# Patient Record
(demographics unavailable — no encounter records)

---

## 2025-01-08 NOTE — PHYSICAL EXAM
[Respiratory Effort] : normal respiratory effort [Normal Heart Sounds] : normal heart sounds [2+] : left 2+ [Alert] : alert [Calm] : calm [Abdomen Tenderness] : ~T ~M No abdominal tenderness [de-identified] : WN/WD, NAD [de-identified] : NC/AT [de-identified] : supple [de-identified] : BS+, soft [de-identified] : FROM [de-identified] : grossly intact

## 2025-01-08 NOTE — HISTORY OF PRESENT ILLNESS
[FreeTextEntry1] : 77M PMHx, CAD s/p CABG, pAF/AT on Xarelto, s/p PPM (2015), HLD, renal stones s/p lithotripsy x1 who was admitted last month with c/o nausea, abdominal distention, and worsening generalized abdominal pain. Vascular surgery was consulted for suspected SMA bleed seen on CTA. During hospital stay patient stabilized, vitals WNL, Hgb stable at 8.8, was asymptomatic and no vascular intervention was indicated. He returns today for reevaluation. Patient states that he has been doing well since he left the hospital, his Hgb has been stable, denies abdominal or back pain, and he is back on Xarelto.

## 2025-01-08 NOTE — ASSESSMENT
[FreeTextEntry1] : 77M PMHx, CAD s/p CABG, pAF/AT on Xarelto, s/p PPM (2015), HLD, who was admitted last month with c/o nausea, abdominal distention, and worsening generalized abdominal pain. Vascular surgery was consulted for suspected SMA bleed seen on CTA. He returns today for reevaluation. Patient feels well, tolerating Xarelto and states that his H/H is stable. We discussed the findings of CTA of A/P from his hospitalization and explained that it is less likely that his bleeding was from mesenteric arteries, however we would like to repeat CTA to make sure his hematoma is resolving and there is no active bleed, given the fact that he resumed AC. Patient agrees and will have CTA A/P in the next few days and we will review the images once it is done.

## 2025-01-08 NOTE — PHYSICAL EXAM
[Respiratory Effort] : normal respiratory effort [Normal Heart Sounds] : normal heart sounds [2+] : left 2+ [Alert] : alert [Calm] : calm [Abdomen Tenderness] : ~T ~M No abdominal tenderness [de-identified] : WN/WD, NAD [de-identified] : NC/AT [de-identified] : supple [de-identified] : BS+, soft [de-identified] : FROM [de-identified] : grossly intact

## 2025-01-08 NOTE — ADDENDUM
[FreeTextEntry1] : This note was written by Ankita HUNT, acting as a scribe for Dr. Jen Harris.  I, Dr. Jen Harris, have read and attest that all the information, medical decision-making, and discharge instructions within are true and accurate.  I, Dr. Jen Harris, personally performed the evaluation and management (E/M) services for this new patient.  That E/M includes conducting the initial examination, assessing all conditions, and establishing the plan of care.  Today, my ACP, Ankita HUNT, was here to observe my evaluation and management services for this patient to be followed going forward.

## 2025-02-05 NOTE — PROCEDURE
[Atrial Fibrillation] : atrial fibrillation [Pacemaker] : pacemaker [VVIR] : VVIR [Longevity: ___ months] : The estimated remaining battery life is [unfilled] months [Lead Imp:  ___ohms] : lead impedance was [unfilled] ohms [Sensing Amplitude ___mv] : sensing amplitude was [unfilled] mv [___V @] : [unfilled] V [___ ms] : [unfilled] ms [de-identified] : 60 [de-identified] :  99.1%  AT/%

## 2025-02-05 NOTE — PROCEDURE
[Atrial Fibrillation] : atrial fibrillation [Pacemaker] : pacemaker [VVIR] : VVIR [Longevity: ___ months] : The estimated remaining battery life is [unfilled] months [Lead Imp:  ___ohms] : lead impedance was [unfilled] ohms [Sensing Amplitude ___mv] : sensing amplitude was [unfilled] mv [___V @] : [unfilled] V [___ ms] : [unfilled] ms [de-identified] : 60 [de-identified] :  99.1%  AT/%

## 2025-02-05 NOTE — HISTORY OF PRESENT ILLNESS
[SOB] : no dyspnea [Syncope] : no syncope [Dizziness] : no dizziness [Chest Pain] : no chest pain or discomfort [Shoulder Pain] : no shoulder pain [Pain at Site] : no pain at device site [Erythema at Site] : no erythema at device site [Swelling at Site] : no swelling at device site [de-identified] : 75 yo man with CAD, S/P CABG, PAF/AT (on anti-coagulation) who was found to be in complete heart block with associated dizziness and fatigue.  He underwent PPM implant on 4/30/15.  He had reported that his exercise was limited by development of SOB.  Stress test showed him developing AFib with a relative pacemaker mediated slowing of his heart rate. The pacemaker was reprogrammed to manage this.  Reprogramming again done to allow for improved HR response to exercise.    Prior interrogation notable for persistent atrial fibrillation with CVR since 8/2020.  He was started on Multaq and is s/p DCCV 12/23/20.  He did not note any symptomatic difference post procedure; maintained SR for a brief period of time.  Felt that Multaq has disturbed his "general functioning".  The decision was made to D/C Multaq and proceed with a rate control / anticoagulation strategy.   LRL increased from 50 to 60 and rate response made more aggressive 8/2022- noted some improvement.  TTM visit today as his wife has been hospitalized.  Offers no incisional complaints.  S/P generator change at [facility] on 11/8/2023.   admitted with abdominal hematoma.  wants to stay on AC and see

## 2025-02-05 NOTE — PHYSICAL EXAM
[General Appearance - Well Developed] : well developed [Normal Appearance] : normal appearance [Well Groomed] : well groomed [General Appearance - Well Nourished] : well nourished [No Deformities] : no deformities [General Appearance - In No Acute Distress] : no acute distress [Heart Sounds] : normal S1 and S2 [Murmurs] : no murmurs present [Edema] : no peripheral edema present [] : no respiratory distress [Respiration, Rhythm And Depth] : normal respiratory rhythm and effort [Exaggerated Use Of Accessory Muscles For Inspiration] : no accessory muscle use [Auscultation Breath Sounds / Voice Sounds] : lungs were clear to auscultation bilaterally [Left Infraclavicular] : left infraclavicular area [Clean] : clean [Dry] : dry [Well-Healed] : well-healed [Bowel Sounds] : normal bowel sounds [Abdomen Soft] : soft [Abdomen Tenderness] : non-tender [Palpable Crepitus] : no palpable crepitus [Bleeding] : no active bleeding [Foul Odor] : no foul smell [Purulent Drainage] : no purulent drainage [Serosanguineous Drainage] : no serosanquineous drainage [Serous Drainage] : no serous drainage [Erythema] : not erythematous [Warm] : not warm [Tender] : not tender [Indurated] : not indurated [Fluctuant] : not fluctuant [FreeTextEntry2] : No swelling at left pectoralis site [FreeTextEntry1] : left arm and hand without swelling; pulses +2 and equal B/L; brisk cap refill; skin warm and pink B/L

## 2025-02-05 NOTE — DISCUSSION/SUMMARY
[Pacemaker Function Normal] : normal pacemaker function [FreeTextEntry1] : see deshawn   He feels well after the numerous "tweaks" to the device programming. He is now in permanent atrial fibrillation and the decision was previously made to allow him to remain in rate controlled fibrillation and long-term anticoagulation. He has no recurrence of anginal symptoms post CABG in the past.

## 2025-02-05 NOTE — ADDENDUM
[FreeTextEntry1] : I, Braulio Mills, am scribing for and the presence of Dr. Garces the following sections HISTORY OF PRESENT ILLNESSS, CARDIOLOGY SUMMARY, ACTIVE PROBLEMS, PAST MEDICAL/FAMILY/SOCIAL HISTORY; REVIEW OF SYSTEMS; VITAL SIGNS; PHYSICAL EXAM, DISCUSSION   I, Felix Garces, hereby attest that the medical record entry for this patient accurately reflects signatures/notations that I made on the Date of Service in my capacity as an Attending Physician when I treated/diagnosed the above patient. I do hereby attest that this information is true, accurate and complete to the best of my knowledge and I understand that any falsification, omission, or concealment of material fact may subject me to administrative, civil, or, criminal liability. I agree with the note as written by my PA in its entirety. I was present for the entire visit and supervised the entire visit and agree with the plan as outlined.

## 2025-02-05 NOTE — HISTORY OF PRESENT ILLNESS
[SOB] : no dyspnea [Syncope] : no syncope [Dizziness] : no dizziness [Chest Pain] : no chest pain or discomfort [Shoulder Pain] : no shoulder pain [Pain at Site] : no pain at device site [Erythema at Site] : no erythema at device site [Swelling at Site] : no swelling at device site [de-identified] : 75 yo man with CAD, S/P CABG, PAF/AT (on anti-coagulation) who was found to be in complete heart block with associated dizziness and fatigue.  He underwent PPM implant on 4/30/15.  He had reported that his exercise was limited by development of SOB.  Stress test showed him developing AFib with a relative pacemaker mediated slowing of his heart rate. The pacemaker was reprogrammed to manage this.  Reprogramming again done to allow for improved HR response to exercise.    Prior interrogation notable for persistent atrial fibrillation with CVR since 8/2020.  He was started on Multaq and is s/p DCCV 12/23/20.  He did not note any symptomatic difference post procedure; maintained SR for a brief period of time.  Felt that Multaq has disturbed his "general functioning".  The decision was made to D/C Multaq and proceed with a rate control / anticoagulation strategy.   LRL increased from 50 to 60 and rate response made more aggressive 8/2022- noted some improvement.  TTM visit today as his wife has been hospitalized.  Offers no incisional complaints.  S/P generator change at [facility] on 11/8/2023.   admitted with abdominal hematoma.  wants to stay on AC and see